# Patient Record
(demographics unavailable — no encounter records)

---

## 2025-05-12 NOTE — ASSESSMENT
[FreeTextEntry1] : 54-year-old female in need of a screening colonoscopy. I reviewed the risks, benefits and alternative of pursuing a colonoscopy. Risks of colonoscopy was reviewed including but not limited to cardiopulmonary complications, risk of bleeding, infection, missed lesions, perforation which could result in emergent surgery. She will require cardiac clearance from Dr. Vieyra. Will proceed with scheduling colonoscopy with Dr. Thomson.

## 2025-05-12 NOTE — HISTORY OF PRESENT ILLNESS
[Home] : at home, [unfilled] , at the time of the visit. [Medical Office: ([facility])___] : at the medical office located in  [Telephone (audio)] : This telephonic visit was provided via audio only technology. [No access to tele-video equipment] : patient lacks access to tele-video equipment. [Verbal consent obtained from patient] : the patient, [unfilled] [FreeTextEntry1] : 54 year old female who presents for telehealth visit to discuss screening colonoscopy. Her PCP Dr. Aileen Barrera recommended her to schedule her colonoscopy. She has never had a colonoscopy or colon cancer screening. Her  was recently diagnosed with rectal cancer.  She has no current gastrointestinal symptoms. She denies abdominal pain, nausea, vomiting, changes in bowel function, melena or hematochezia. No known family history of colon cancer or polyps. No personal or family history of polyps, cancer, or inflammatory bowel disease in primary relative.  Past medical history includes pyloric stenosis as an infant which she had surgery, HTN which she occasionally takes Olmesartan 20 mg but not on a daily basis.  She is allergic to codeine which causes her legs to be numb.   Her cardiologist is Dr. Tre Vieyra whom will be obtaining a stress test in June. She feels fine otherwise.